# Patient Record
Sex: FEMALE | Race: WHITE | HISPANIC OR LATINO | Employment: FULL TIME | URBAN - METROPOLITAN AREA
[De-identification: names, ages, dates, MRNs, and addresses within clinical notes are randomized per-mention and may not be internally consistent; named-entity substitution may affect disease eponyms.]

---

## 2017-10-26 ENCOUNTER — ALLSCRIPTS OFFICE VISIT (OUTPATIENT)
Dept: OTHER | Facility: OTHER | Age: 42
End: 2017-10-26

## 2017-10-26 DIAGNOSIS — R51.9 HEADACHE: ICD-10-CM

## 2017-10-26 DIAGNOSIS — R10.2 PELVIC AND PERINEAL PAIN: ICD-10-CM

## 2017-10-26 DIAGNOSIS — Z13.220 ENCOUNTER FOR SCREENING FOR LIPOID DISORDERS: ICD-10-CM

## 2017-10-26 DIAGNOSIS — R10.9 ABDOMINAL PAIN: ICD-10-CM

## 2017-10-26 DIAGNOSIS — Z12.31 ENCOUNTER FOR SCREENING MAMMOGRAM FOR MALIGNANT NEOPLASM OF BREAST: ICD-10-CM

## 2017-10-26 DIAGNOSIS — L65.9 NONSCARRING HAIR LOSS: ICD-10-CM

## 2017-10-27 ENCOUNTER — HOSPITAL ENCOUNTER (OUTPATIENT)
Dept: ULTRASOUND IMAGING | Facility: HOSPITAL | Age: 42
Discharge: HOME | End: 2017-10-27
Payer: COMMERCIAL

## 2017-10-27 ENCOUNTER — GENERIC CONVERSION - ENCOUNTER (OUTPATIENT)
Dept: OTHER | Facility: OTHER | Age: 42
End: 2017-10-27

## 2017-10-27 ENCOUNTER — HOSPITAL ENCOUNTER (OUTPATIENT)
Dept: MAMMOGRAPHY | Facility: HOSPITAL | Age: 42
Discharge: HOME | End: 2017-10-27
Payer: COMMERCIAL

## 2017-10-27 DIAGNOSIS — R10.2 PELVIC AND PERINEAL PAIN: ICD-10-CM

## 2017-10-27 DIAGNOSIS — Z12.31 ENCOUNTER FOR SCREENING MAMMOGRAM FOR MALIGNANT NEOPLASM OF BREAST: ICD-10-CM

## 2017-10-27 PROCEDURE — 76856 US EXAM PELVIC COMPLETE: CPT

## 2017-10-27 PROCEDURE — 76830 TRANSVAGINAL US NON-OB: CPT

## 2017-10-27 PROCEDURE — G0202 SCR MAMMO BI INCL CAD: HCPCS

## 2017-11-17 ENCOUNTER — LAB REQUISITION (OUTPATIENT)
Dept: LAB | Facility: HOSPITAL | Age: 42
End: 2017-11-17
Payer: COMMERCIAL

## 2017-11-17 ENCOUNTER — GENERIC CONVERSION - ENCOUNTER (OUTPATIENT)
Dept: OTHER | Facility: OTHER | Age: 42
End: 2017-11-17

## 2017-11-17 DIAGNOSIS — Z01.419 ENCOUNTER FOR GYNECOLOGICAL EXAMINATION WITHOUT ABNORMAL FINDING: ICD-10-CM

## 2017-11-17 PROCEDURE — G0145 SCR C/V CYTO,THINLAYER,RESCR: HCPCS | Performed by: NURSE PRACTITIONER

## 2017-11-17 PROCEDURE — 87624 HPV HI-RISK TYP POOLED RSLT: CPT | Performed by: NURSE PRACTITIONER

## 2017-11-21 LAB — HPV RRNA GENITAL QL NAA+PROBE: NORMAL

## 2017-11-27 LAB
LAB AP GYN PRIMARY INTERPRETATION: NORMAL
Lab: NORMAL

## 2017-11-29 ENCOUNTER — ALLSCRIPTS OFFICE VISIT (OUTPATIENT)
Dept: OTHER | Facility: OTHER | Age: 42
End: 2017-11-29

## 2017-11-29 DIAGNOSIS — R10.9 ABDOMINAL PAIN: ICD-10-CM

## 2017-11-30 NOTE — CONSULTS
Assessment    1  Acute constipation (564 00) (K59 00)   2  Abdominal discomfort, epigastric (789 06) (R10 13)   3  GERD (gastroesophageal reflux disease) (530 81) (K21 9)   4  Helicobacter pylori infection (041 86) (A04 8)    Plan  Abdominal discomfort, epigastric    · Hyoscyamine Sulfate 0 125 MG Sublingual Tablet Sublingual; PLACE 1 TABLETUNDER THE TONGUE  3 TIMES DAILY AS NEEDED   Rx By: Kanwal Zamorano; Dispense: 30 Days ; #:90 Tablet Sublingual; Refill: 3;For: Abdominal discomfort, epigastric; TACOS = N; Verified Transmission to [facility] ; Last Updated By: System, SureScripts; 2017 9:55:53 AM   · EGD; Status:Hold For - Scheduling; Requested SCE:[id number];    Perform:[facility]; [id number]; For:Abdominal discomfort, epigastric; Ordered By:Frandy Mejia; Abdominal pain    · US LIVER; Status:Hold For - Scheduling; Requested for:2017;    Perform:[facility]; [phone]; Ordered; For:Abdominal pain; Ordered By:Frandy Mejia;  Acute constipation    · Linzess 290 MCG Oral Capsule; take 1 capsule daily   Rx By: Kanwal Zamorano; Dispense: 30 Days ; #:30 Capsule; Refill: 5;Acute constipation; TACOS = N; Verified Transmission to [facility] ; Last Updated By: System, SureScripts; 2017 9:51:33 AM    Discussion/Summary  Discussion Summary:   Constipation may be the cause for majority of her symptoms such as abdominal distension and bloating  We will try to see if Linzess is affective it is not we will do a motility workup  We can do a colonic transit study at that time  I am going to prescribe 290 mcg due to association of abdominal discomfort I will also give her samples of 145 mcg  I have also provided her with a sample of movie prep to clean her out prior to starting treatment for constipation  discomfort will further evaluate to make sure there is not a biliary source with ultrasound of the right upper quadrant and EGD due to her previous history of H pylori  Eradication was not confirmed at that time  She also mentioned she had gastric polyps for further evaluation would recommend EGD evaluation  Will also evaluate for Jean Baptiste's esophagus and hiatal hernia due to longstanding history of heartburn  Chief Complaint  Chief Complaint Free Text Note Form: Patient is new to the office  Patient complaints of stomach pain, bloating, heartburn, and constipation  History of Present Illness  HPI: Ms Khadra Flores is a 53-year-old female presents here for multiple GI issues including constipation which seems to be the major issue at this time  She does also have history of heartburn, abdominal bloating, epigastric pain  She also has previous history of H pylori status post treatment  And states she has history of gastric polyps  Denies NSAID use has had endoscopies which identified H pylori this was several years ago status post treatment  has had constipation for several years has tried over-the-counter medications such as milk of magnesium and other stool regimens which have not helped overall alleviate the symptoms  Symptoms him to be worse during her menstrual cycle  She has not tried any prescription medications  Her TSH was within normal limits  Denies any a PCI to me in the past  CBC, TSH, CMP were within normal limits  also has abdominal discomfort previous history of   Bloating seems to be related to constipation  Last bowel movement was 2 weeks ago  alarm symptoms  She also has rare intermittent reflux symptoms  discomfort with no clear relationship to oral intake of food  Pain can be long-lasting without any clear alleviating factors  LFTs are within normal limits  Review of Systems  Complete-Female GI Adult:  Constitutional: No fever, no chills, feels well, no tiredness, no recent weight gain or weight loss  Eyes: No complaints of eye pain, no red eyes, no eyesight problems, no discharge, no dry eyes, no itching of eyes    ENT: no complaints of earache, no loss of hearing, no nose bleeds, no nasal discharge, no sore throat, no hoarseness  Cardiovascular: No complaints of slow heart rate, no fast heart rate, no chest pain, no palpitations, no leg claudication, no lower extremity edema  Respiratory: No complaints of shortness of breath, no wheezing, no cough, no SOB on exertion, no orthopnea, no PND  Gastrointestinal: abdominal pain,-- constipation-- and-- Heartburn, but-- No complaints of abdominal pain, no constipation, no nausea or vomiting, no diarrhea, no bloody stools  Genitourinary: No complaints of dysuria, no incontinence, no pelvic pain, no dysmenorrhea, no vaginal discharge or bleeding  Musculoskeletal: No complaints of arthralgias, no myalgias, no joint swelling or stiffness, no limb pain or swelling  Integumentary: No complaints of skin rash or lesions, no itching, no skin wounds, no breast pain or lump  Neurological: No complaints of headache, no confusion, no convulsions, no numbness, no dizziness or fainting, no tingling, no limb weakness, no difficulty walking  Psychiatric: Not suicidal, no sleep disturbance, no anxiety or depression, no change in personality, no emotional problems  Endocrine: No complaints of proptosis, no hot flashes, no muscle weakness, no deepening of the voice, no feelings of weakness  Hematologic/Lymphatic: No complaints of swollen glands, no swollen glands in the neck, does not bleed easily, does not bruise easily  ROS Reviewed:   ROS reviewed  Active Problems    1  Abdominal pain (789 00) (R10 9)   2  Cervical cancer screening (V76 2) (Z12 4)   3  Depression screening (V79 0) (Z13 89)   4  Encounter for routine gynecological examination with Papanicolaou smear of cervix (V72 31,V76 2) (Z01 419)   5  Encounter for screening mammogram for breast cancer (V76 12) (Z12 31)   6  Female pelvic pain (625 9) (R10 2)   7  GERD (gastroesophageal reflux disease) (530 81) (K21 9)   8  Headache (784 0) (R51)   9  Lipid screening (V77 91) (Z13 220)   10  Loss of hair (704 00) (L65 9)    Past Medical History  Active Problems And Past Medical History Reviewed: The active problems and past medical history were reviewed and updated today  Surgical History  1  History of  Section   2  History of Tubal Ligation  Surgical History Reviewed: The surgical history was reviewed and updated today  Family History  Mother    1  Denied: Family history of colon cancer   2  Denied: Family history of liver disease  Father    3  Denied: Family history of colon cancer   4  Denied: Family history of liver disease  Family History    5  Family history of diabetes mellitus (V18 0) (Z83 3)   6  Family history of hypertension (V17 49) (Z82 49)   7  Family history of malignant neoplasm (V16 9) (Z80 9)  Family History Reviewed: The family history was reviewed and updated today  Social History   · Denied: History of Drug use   · Denied: History of drug use   · Never a smoker   · Social alcohol use (Z78 9)  Social History Reviewed: The social history was reviewed and updated today  Current Meds   1  No Reported Medications Recorded  Medication List Reviewed: The medication list was reviewed and updated today  Allergies  1  No Known Drug Allergies   2  No Known Drug Allergies    Vitals  Vital Signs    Recorded: [id number] 09:21AM   Temperature 97 F   Heart Rate 86   Systolic 989   Diastolic 72   Weight 626 lb    BMI Calculated 30 08   BSA Calculated 1 67   O2 Saturation 98       Physical Exam   Constitutional  General appearance: No acute distress, well appearing and well nourished  Eyes  Conjunctiva and lids: No swelling, erythema or discharge  Ears, Nose, Mouth, and Throat  External inspection of ears and nose: Normal    Oropharynx: Normal with no erythema, edema, exudate or lesions  Pulmonary  Respiratory effort: No increased work of breathing or signs of respiratory distress     Auscultation of lungs: Clear to auscultation  Cardiovascular  Palpation of heart: Normal PMI, no thrills  Auscultation of heart: Normal rate and rhythm, normal S1 and S2, without murmurs  Examination of extremities for edema and/or varicosities: Normal    Abdomen  Abdomen: Non-tender, no masses  Liver and spleen: No hepatomegaly or splenomegaly  Musculoskeletal  Gait and station: Normal    Neurologic  Cranial nerves: Cranial nerves 2-12 intact     Psychiatric  Orientation to person, place, and time: Normal          Signatures   Electronically signed by : Pearl Velasquez MD; Nov 29 2017 10:06AM EST                       (Author)

## 2017-12-06 ENCOUNTER — HOSPITAL ENCOUNTER (OUTPATIENT)
Dept: ULTRASOUND IMAGING | Facility: HOSPITAL | Age: 42
Discharge: HOME | End: 2017-12-06
Attending: INTERNAL MEDICINE
Payer: COMMERCIAL

## 2017-12-06 DIAGNOSIS — R10.9 ABDOMINAL PAIN: ICD-10-CM

## 2017-12-06 PROCEDURE — 76705 ECHO EXAM OF ABDOMEN: CPT

## 2017-12-09 ENCOUNTER — GENERIC CONVERSION - ENCOUNTER (OUTPATIENT)
Dept: OTHER | Facility: OTHER | Age: 42
End: 2017-12-09

## 2018-01-12 VITALS
OXYGEN SATURATION: 98 % | HEART RATE: 86 BPM | TEMPERATURE: 97 F | DIASTOLIC BLOOD PRESSURE: 72 MMHG | SYSTOLIC BLOOD PRESSURE: 116 MMHG | WEIGHT: 154 LBS

## 2018-01-12 NOTE — RESULT NOTES
Verified Results  * US PELVIS COMPLETE (TRANSABDOMINAL AND TRANSVAGINAL) [id number] 02:25PM Daren Pang Order Number: [id number]    - Patient Instructions: To schedule this appointment, please contact Central Scheduling at [phone]  Test Name Result Flag Reference   US PELVIS COMPLETE (TRANSABDOMINAL AND TRANSVAGINAL) (Report)     PELVIC ULTRASOUND, COMPLETE     INDICATION: Right lower quadrant pain, bloating, cramping LMP was 10/10/2017      COMPARISON: None  TECHNIQUE:  Transabdominal pelvic ultrasound was performed in sagittal and transverse planes with a curvilinear transducer  Additional transvaginal imaging was performed to better evaluate the endometrium and ovaries  Imaging included volumetric    sweeps as well as traditional still imaging technique  FINDINGS:     Transvaginal exam was limited due to uterine position  UTERUS:   The uterus is anteverted in position, measuring 4 0 x 6 1 x 12 1 cm  Contour and echotexture appear normal      The cervix shows no suspicious abnormality  ENDOMETRIUM:    Normal caliber of 9 mm  Homogenous and normal in appearance  OVARIES/ADNEXA:   Right ovary: 1 4 x 2 9 x 1 7 cm  No suspicious right ovarian abnormality  Doppler flow within normal limits  Left ovary: 2 5 x 2 4 x 2 9 cm  No suspicious left ovarian abnormality  Doppler flow within normal limits  No suspicious adnexal mass or loculated collections  There is no free fluid         IMPRESSION:      Normal  Limited transvaginal exam           Workstation performed: [id number]     Signed by:   Eusebio Chong MD   10/31/17

## 2018-01-13 VITALS
WEIGHT: 150 LBS | SYSTOLIC BLOOD PRESSURE: 120 MMHG | TEMPERATURE: 97 F | RESPIRATION RATE: 18 BRPM | HEART RATE: 88 BPM | BODY MASS INDEX: 29.45 KG/M2 | HEIGHT: 60 IN | OXYGEN SATURATION: 99 % | DIASTOLIC BLOOD PRESSURE: 80 MMHG

## 2018-01-22 VITALS
SYSTOLIC BLOOD PRESSURE: 115 MMHG | BODY MASS INDEX: 30.04 KG/M2 | HEIGHT: 60 IN | DIASTOLIC BLOOD PRESSURE: 72 MMHG | WEIGHT: 153 LBS

## 2018-01-23 NOTE — RESULT NOTES
Discussion/Summary    ultrasound negative for any acute issues     Verified Results  US LIVER [id number] 07:59AM Dom Loo Order Number: [id number]    - Patient Instructions: To schedule this appointment, please contact Central Scheduling at [phone]  Test Name Result Flag Reference   US LIVER (Report)     RIGHT UPPER QUADRANT ULTRASOUND     INDICATION: 82-year-old female with right-sided abdominal pain and bloating  COMPARISON: None  TECHNIQUE:  Real-time ultrasound of the right upper quadrant was performed with a curvilinear transducer with both volumetric sweeps and still imaging techniques  FINDINGS:     PANCREAS: Visualized portions of the pancreas are within normal limits  AORTA AND IVC: Visualized portions are normal for patient age  LIVER:   Size: Within normal range  The liver measures 14 cm in the midclavicular line  Contour: Surface contour is smooth  Parenchyma: Echogenicity and echotexture are within normal limits  No evidence of suspicious mass  Limited imaging of the main portal vein shows it to be patent and hepatopetal       BILIARY:   The gallbladder is normal in caliber  No wall thickening or pericholecystic fluid  No stones or sludge identified  No sonographic Bey's sign  No intrahepatic biliary dilatation  CBD measures 3 mm  No choledocholithiasis  KIDNEY:    Right kidney measures 10 8 x 4 point cm  Within normal limits  ASCITES:  None         IMPRESSION:     Normal        Workstation performed: [id number]     Signed by:   Trevon Ballard MD   12/8/17

## 2018-01-26 ENCOUNTER — HOSPITAL ENCOUNTER (OUTPATIENT)
Facility: HOSPITAL | Age: 43
Setting detail: OUTPATIENT SURGERY
Discharge: HOME | End: 2018-01-26
Attending: INTERNAL MEDICINE | Admitting: INTERNAL MEDICINE
Payer: COMMERCIAL

## 2018-01-26 ENCOUNTER — ANESTHESIA (OUTPATIENT)
Dept: GASTROENTEROLOGY | Facility: HOSPITAL | Age: 43
End: 2018-01-26
Payer: COMMERCIAL

## 2018-01-26 ENCOUNTER — ANESTHESIA EVENT (OUTPATIENT)
Dept: GASTROENTEROLOGY | Facility: HOSPITAL | Age: 43
End: 2018-01-26
Payer: COMMERCIAL

## 2018-01-26 VITALS
BODY MASS INDEX: 30.23 KG/M2 | RESPIRATION RATE: 20 BRPM | WEIGHT: 154 LBS | TEMPERATURE: 97.8 F | HEIGHT: 60 IN | HEART RATE: 85 BPM | SYSTOLIC BLOOD PRESSURE: 114 MMHG | DIASTOLIC BLOOD PRESSURE: 61 MMHG | OXYGEN SATURATION: 100 %

## 2018-01-26 DIAGNOSIS — R10.13 ABDOMINAL DISCOMFORT, EPIGASTRIC: ICD-10-CM

## 2018-01-26 PROCEDURE — 88305 TISSUE EXAM BY PATHOLOGIST: CPT | Performed by: PATHOLOGY

## 2018-01-26 PROCEDURE — 88342 IMHCHEM/IMCYTCHM 1ST ANTB: CPT | Performed by: PATHOLOGY

## 2018-01-26 PROCEDURE — 88305 TISSUE EXAM BY PATHOLOGIST: CPT | Performed by: INTERNAL MEDICINE

## 2018-01-26 PROCEDURE — 43239 EGD BIOPSY SINGLE/MULTIPLE: CPT | Performed by: INTERNAL MEDICINE

## 2018-01-26 PROCEDURE — 88342 IMHCHEM/IMCYTCHM 1ST ANTB: CPT | Performed by: INTERNAL MEDICINE

## 2018-01-26 RX ORDER — SODIUM CHLORIDE 9 MG/ML
50 INJECTION, SOLUTION INTRAVENOUS CONTINUOUS
Status: DISCONTINUED | OUTPATIENT
Start: 2018-01-26 | End: 2018-01-26 | Stop reason: HOSPADM

## 2018-01-26 RX ORDER — PROPOFOL 10 MG/ML
INJECTION, EMULSION INTRAVENOUS CONTINUOUS PRN
Status: DISCONTINUED | OUTPATIENT
Start: 2018-01-26 | End: 2018-01-26 | Stop reason: SURG

## 2018-01-26 RX ORDER — PROPOFOL 10 MG/ML
INJECTION, EMULSION INTRAVENOUS AS NEEDED
Status: DISCONTINUED | OUTPATIENT
Start: 2018-01-26 | End: 2018-01-26 | Stop reason: SURG

## 2018-01-26 RX ORDER — LIDOCAINE HYDROCHLORIDE 10 MG/ML
INJECTION, SOLUTION INFILTRATION; PERINEURAL AS NEEDED
Status: DISCONTINUED | OUTPATIENT
Start: 2018-01-26 | End: 2018-01-26 | Stop reason: SURG

## 2018-01-26 RX ADMIN — SODIUM CHLORIDE 50 ML/HR: 0.9 INJECTION, SOLUTION INTRAVENOUS at 13:08

## 2018-01-26 RX ADMIN — LIDOCAINE HYDROCHLORIDE 100 MG: 10 INJECTION, SOLUTION INFILTRATION; PERINEURAL at 13:30

## 2018-01-26 RX ADMIN — PROPOFOL 80 MG: 10 INJECTION, EMULSION INTRAVENOUS at 13:30

## 2018-01-26 RX ADMIN — PROPOFOL 140 MCG/KG/MIN: 10 INJECTION, EMULSION INTRAVENOUS at 13:30

## 2018-01-26 NOTE — OP NOTE
**** GI/ENDOSCOPY REPORT ****     PATIENT NAME: Erik Villareal - VISIT ID:  Patient ID: [id number] YOB: 1975     INTRODUCTION: Esophagogastroduodenoscopy - A 38 female patient presents   for an outpatient Esophagogastroduodenoscopy at [address] [place] [address]  INDICATIONS: Dyspepsia  history of H Pylori     CONSENT: The benefits, risks, and alternatives to the procedure were   discussed and informed consent was obtained from the patient  PREPARATION:  EKG, pulse, pulse oximetry and blood pressure were monitored   throughout the procedure  MEDICATIONS: Anesthesia administered by anesthesiologist      PROCEDURE:  The endoscope was passed without difficulty through the mouth   under direct visualization and advanced to the 2nd portion of the   duodenum  The scope was withdrawn and the mucosa was carefully examined  FINDINGS:   Esophagus: The esophagus appeared to be normal   GE junction:   The GE junction appeared to be normal   Stomach: The stomach appeared to   be normal  Multiple random biopsies was taken from the antrum and body of   the stomach  Pylorus: The pylorus appeared to be normal, symmetrical, and   patent  Duodenum: The duodenal bulb and 2nd portion of the duodenum   appeared to be normal  Multiple random biopsies was taken from the 2nd   portion of the duodenum and duodenal bulb  COMPLICATIONS: There were no complications  IMPRESSIONS: Normal esophagus  Normal GE junction  Normal stomach  Normal,   symmetrical, and patent pylorus  Normal duodenal bulb and 2nd portion of   the duodenum  Multiple biopsies taken  RECOMMENDATIONS:     ESTIMATED BLOOD LOSS:     PATHOLOGY SPECIMENS: Multiple random biopsies taken from the antrum and   body of the stomach  Multiple random biopsies taken from the 2nd portion   of the duodenum and duodenal bulb       PROCEDURE CODES: 37911 - EGD flexible; with biopsy     ICD-9 Codes: 536 8 Dyspepsia and other specified disorders of function of   stomach     ICD-10 Codes: K30 Functional dyspepsia     PERFORMED BY: MIGUEL A Broussard  on 01/26/2018  Version 1, electronically signed by MIGUEL A Shaw  on 01/26/2018 at   13:44

## 2018-01-26 NOTE — ANESTHESIA PREPROCEDURE EVALUATION
Review of Systems/Medical History  Patient summary reviewed        Cardiovascular   Pulmonary       GI/Hepatic    GERD ,             Endo/Other     GYN       Hematology   Musculoskeletal       Neurology   Psychology           Physical Exam    Airway    Mallampati score: II  TM Distance: >3 FB  Neck ROM: full     Dental       Cardiovascular      Pulmonary      Other Findings        Anesthesia Plan  ASA Score- 2     Anesthesia Type- IV sedation with anesthesia with ASA Monitors  Additional Monitors:   Airway Plan:         Plan Factors-    Induction- intravenous  Postoperative Plan-     Informed Consent- Anesthetic plan and risks discussed with patient

## 2018-02-02 ENCOUNTER — TELEPHONE (OUTPATIENT)
Dept: GASTROENTEROLOGY | Facility: MEDICAL CENTER | Age: 43
End: 2018-02-02

## 2018-02-02 NOTE — TELEPHONE ENCOUNTER
----- Message from Matt Haque MD sent at 1/30/2018  3:15 PM EST -----  Call patient to report normal results